# Patient Record
Sex: MALE | Race: WHITE | URBAN - METROPOLITAN AREA
[De-identification: names, ages, dates, MRNs, and addresses within clinical notes are randomized per-mention and may not be internally consistent; named-entity substitution may affect disease eponyms.]

---

## 2018-09-06 ENCOUNTER — OFFICE VISIT (OUTPATIENT)
Dept: CARDIOLOGY CLINIC | Age: 68
End: 2018-09-06

## 2018-09-06 VITALS
HEIGHT: 67 IN | WEIGHT: 276 LBS | OXYGEN SATURATION: 97 % | RESPIRATION RATE: 16 BRPM | SYSTOLIC BLOOD PRESSURE: 130 MMHG | BODY MASS INDEX: 43.32 KG/M2 | HEART RATE: 68 BPM | DIASTOLIC BLOOD PRESSURE: 80 MMHG

## 2018-09-06 DIAGNOSIS — I35.8 SYSTOLIC MURMUR OF AORTA: ICD-10-CM

## 2018-09-06 DIAGNOSIS — R94.31 ABNORMAL EKG: ICD-10-CM

## 2018-09-06 DIAGNOSIS — E66.01 MORBID OBESITY (HCC): ICD-10-CM

## 2018-09-06 DIAGNOSIS — R07.89 CHEST TIGHTNESS OR PRESSURE: Primary | ICD-10-CM

## 2018-09-06 DIAGNOSIS — R06.02 SOB (SHORTNESS OF BREATH): ICD-10-CM

## 2018-09-06 DIAGNOSIS — R94.31 T WAVE INVERSION IN EKG: ICD-10-CM

## 2018-09-06 RX ORDER — TERBINAFINE HYDROCHLORIDE 250 MG/1
TABLET ORAL
Refills: 0 | COMMUNITY
Start: 2018-08-31

## 2018-09-06 RX ORDER — HYDROCORTISONE 25 MG/G
CREAM TOPICAL
Refills: 2 | COMMUNITY
Start: 2018-08-31 | End: 2018-09-06 | Stop reason: ALTCHOICE

## 2018-09-06 NOTE — PROGRESS NOTES
Tamia Dial     1950       Corky May MD, [facility] [place]  Date of Visit-9/6/2018   PCP is Bonnie Moran MD   [facility] [place]  [facility]  HPI:  Tamia Dial is a 74 y.o. male from [facility] pt referred by and by  Bonnie Moran MD PC and  Pt [facility] [place] [facility] Sameera Fagan for shortness of breath  and chest pain . Pt states that his CP has been worsening in the past week, along with SOB, which has been going on for years. Pt complains that he also has back pain every now and then. Pt states that he had no history of prior  heart disease. Pt states. Pt is currently on hydrocortisone and terbinafine. The pain is midsternal last a few minutes, does not always happen with exertion, variable  And is dull  Denies edema, syncope  has no tachycardia, palpitations or sense of arrhythmia. EKG: sinus rhythm of 68 with T-wave inversions anterior leads. Records of lab 7-17-18 from PCP reviewed  As well as note 7-13-18  HDL 40  TSH normal Cr 0.9 hgb 14.5      Assessment/Plan:     1. Chest pressure and pain with associated SOB and aortic murmur. We will need to exclude CHF, AS or CAD. Abnormal T wave inversions non specific  2. Obesity. Advise on weight loss. 3. No evidence of anemia on recent blood work. Kidney function is normal with creatinine of 0.9.    4. Plans Echo and stress nuclear   5. Murmur likely aortic  Note abnormal EKG.    6. Follow up in 6 months if tests are WNL  If abnormal may need cath  Future Appointments  Date Time Provider Neela King   9/20/2018 8:00 AM ECHOTWO, [id number]   9/20/2018 8:30 AM HARSHAD RICCI SCHED   9/21/2018 8:30 AM HARSHAD RICCI SCHED   2/4/2019 8:20 AM Jose Duval  [address]      Patient Instructions   You will be scheduled for an echo and nuclear stress test.      You will be scheduled for nuclear stress testing after your appointment today. Wear comfortable clothing (shorts or pants with a shirt or blouse- no underwire bras) and walking or athletic shoes. Do not eat or drink anything, except water, for at least 2 hours prior to your appointment. Avoid tobacco products for at least 6 hours prior to your test.    Do not eat or drink anything containing caffeine, including but not limited to the following: chocolate, regular and decaffeinated coffee, soft drinks, or tea for at least 12-24 hours prior to your test.    Do not hold your scheduled medications prior to your test. If you are a diabetic, please ask your physician how to adjust your food and insulin prior to your test. Please bring all medications you are currently taking. You will need to inform our office if you are pregnant, nursing, or think you may be pregnant. Your test will be performed on a 2 day protocol. This is determined by your height, weight, and other risk factors. For a 2 day test, please allow for 2 hours in the office each day. For a 1 day test, please allow for 4 hours in the office that day. The radioactive isotope used for your testing is different from any of the dyes that are commonly used in x-ray procedures, and is ordered specially for your test. Please call to cancel or reschedule your appointment at least 24 hours prior to your scheduled appointment to avoid being billed for the expensive isotope. Key CAD CHF Meds     Patient is on no cardiovascular meds. Impression:   1. Chest tightness or pressure    2. SOB (shortness of breath)    3. Abnormal EKG    4. Systolic murmur of aorta    5. Morbid obesity ([address]    6.  T wave inversion in EKG         Medical Hx= colon polyps  Social Hx= no tobacco, alcohol or caffeine, works as ,  2 kids  Family Hx= father  at 76 of \"heart problems\"    REVIEW OF SYMPTOMS: A  full 12 system ROS is reviewed with aid of new patient form Review of Systems Constitutional: Negative for diaphoresis, fever and malaise/fatigue. HENT: Negative for ear pain, hearing loss, nosebleeds and tinnitus. Eyes: Negative for blurred vision, double vision and pain. Respiratory: Positive for shortness of breath. Negative for cough, hemoptysis, sputum production, wheezing and stridor. Cardiovascular: Positive for chest pain. Negative for palpitations, orthopnea, claudication and leg swelling. Gastrointestinal: Negative for abdominal pain, blood in stool, constipation, diarrhea, heartburn, melena, nausea and vomiting. Genitourinary: Negative for dysuria, frequency and urgency. Musculoskeletal: Negative for back pain, falls, joint pain, myalgias and neck pain. Skin: Negative for rash. Neurological: Negative for dizziness, sensory change, seizures, loss of consciousness, weakness and headaches. Endo/Heme/Allergies: Does not bruise/bleed easily. Psychiatric/Behavioral: Negative for depression, hallucinations and memory loss. The patient is not nervous/anxious and does not have insomnia. see scanned new patient worksheet. No Known Allergies see supplement sheet, initialed and to be scanned by staff  Past Medical History:   Diagnosis Date    Morbid obesity ([facility] [address] 0/3/9162    Systolic murmur of aorta 2/0/4189    T wave inversion in EKG 9/6/2018      Social Hx= reports that he has quit smoking. He has never used smokeless tobacco. He reports that he drinks alcohol. Exam and Labs:  /80 (BP 1 Location: Left arm, BP Patient Position: Sitting)  Pulse 68  Resp 16  Ht 5' 7\" (1.702 m)  Wt 276 lb (125.2 kg)  SpO2 97%  BMI 43.23 kg/q7Spemukqttmhswb:  NAD, comfortable  Head: NC,AT. Eyes: No scleral icterus. Neck:  Neck supple. No JVD present. Throat: moist mucous membranes. Chest: Effort normal & normal respiratory excursion . Neurological: alert, conversant and oriented . Skin: Skin is not cold. No obvious systemic rash noted. Not diaphoretic.  No erythema. Psychiatric:  Grossly normal mood and affect. Behavior appears normal. Extremities:  no clubbing or cyanosis. Abdomen: non distended    Lungs:breath sounds normal. No stridor. distress, wheezes or  Rales. Heart:1-2/6 SARKIS at RUSB and LUSB normal rate, regular rhythm, normal S1, S2, PMI non displaced. Edema: Edema is none. Wt Readings from Last 3 Encounters:   09/06/18 276 lb (125.2 kg)      BP Readings from Last 3 Encounters:   09/06/18 130/80      Current Outpatient Prescriptions   Medication Sig    terbinafine HCl (LAMISIL) 250 mg tablet TK 1 T PO QD    hydrocortisone (HYTONE) 2.5 % topical cream MESSI EXT TO FACE BID     No current facility-administered medications for this visit. Impression see above.       Written by Ankit Ward, as dictated by Barbaar Johnson MD.

## 2018-09-06 NOTE — MR AVS SNAPSHOT
[address] [facility] 
[phone] Patient: Santa Aparicio MRN: [id number] :1950 Visit Information Date & Time Provider Department Dept. Phone Encounter #  
 2018  9:00 AM Ada Fournier MD [facility] Mon[facility] B[facility]amante [phone] [id number] Upcoming Health Maintenance Date Due Hepatitis C Screening 1950 DTaP/Tdap/Td series (1 - Tdap) 1973 FOBT Q 1 YEAR AGE 50-75 2002 ZOSTER VACCINE AGE 60> 10/9/2012 GLAUCOMA SCREENING Q2Y 2017 Pneumococcal 65+ Low/Medium Risk (1 of 2 - PCV13) 2017 Influenza Age 5 to Adult 2018 Allergies as of 2018  Review Complete On: 2018 By: Ada Fournier MD  
 No Known Allergies Current Immunizations  Never Reviewed No immunizations on file. Not reviewed this visit You Were Diagnosed With   
  
 Codes Comments Chest tightness or pressure    -  Primary ICD-10-CM: R07.89 ICD-9-CM: 786.59   
 SOB (shortness of breath)     ICD-10-CM: R06.02 
ICD-9-CM: 786.05 Abnormal EKG     ICD-10-CM: R94.31 
ICD-9-CM: 794.31 Systolic murmur of aorta     ICD-10-CM: I35.8 ICD-9-CM: 785.2 Morbid obesity ([address]     ICD-10-CM: E66.01 
ICD-9-CM: 278.01 T wave inversion in EKG     ICD-10-CM: R94.31 
ICD-9-CM: 794.31 Vitals BP Pulse Resp Height(growth percentile) Weight(growth percentile) SpO2  
 130/80 (BP 1 Location: Left arm, BP Patient Position: Sitting) 68 16 5' 7\" (1.702 m) 276 lb (125.2 kg) 97% BMI Smoking Status 43.23 kg/m2 Former Smoker Vitals History BMI and BSA Data Body Mass Index Body Surface Area  
 43.23 kg/m 2 2.43 m 2 Your Updated Medication List  
  
   
This list is accurate as of 18 10:17 AM.  Always use your most recent med list.  
  
  
  
  
 terbinafine HCl 250 mg tablet Commonly known as:  LAMISIL TK 1 T PO QD  
  
  
  
  
 We Performed the Following AMB POC EKG ROUTINE W/ 12 LEADS, INTER & REP [73008 CPT(R)] ECHO LIMITED [TUY6326 Custom] To-Do List   
 09/06/2018 ECG:  STRESS TEST CARDIOLITE Patient Instructions You will be scheduled for an echo and nuclear stress test.   
 
You will be scheduled for nuclear stress testing after your appointment today. Wear comfortable clothing (shorts or pants with a shirt or blouse- no underwire bras) and walking or athletic shoes. Do not eat or drink anything, except water, for at least 2 hours prior to your appointment. Avoid tobacco products for at least 6 hours prior to your test. 
 
Do not eat or drink anything containing caffeine, including but not limited to the following: chocolate, regular and decaffeinated coffee, soft drinks, or tea for at least 12-24 hours prior to your test. 
 
Do not hold your scheduled medications prior to your test. If you are a diabetic, please ask your physician how to adjust your food and insulin prior to your test. Please bring all medications you are currently taking. You will need to inform our office if you are pregnant, nursing, or think you may be pregnant. Your test will be performed on a 2 day protocol. This is determined by your height, weight, and other risk factors. For a 2 day test, please allow for 2 hours in the office each day. For a 1 day test, please allow for 4 hours in the office that day. The radioactive isotope used for your testing is different from any of the dyes that are commonly used in x-ray procedures, and is ordered specially for your test. Please call to cancel or reschedule your appointment at least 24 hours prior to your scheduled appointment to avoid being billed for the expensive isotope. Introducing [facility]! [facility] introduces [employer] patient portal. Now you can access parts of your medical record, email your doctor's office, and request medication refills online. 1. In your internet browser, go to [email or web address] 2. Click on the First Time User? Click Here link in the Sign In box. You will see the New Member Sign Up page. 3. Enter your [employer] Access Code exactly as it appears below. You will not need to use this code after youve completed the sign-up process. If you do not sign up before the expiration date, you must request a new code. · [employer] Access Code: [email or web address] Expires: 12/5/2018  9:43 AM 
 
4. Enter the last four digits of your Social Security Number (xxxx) and Date of Birth (mm/dd/yyyy) as indicated and click Submit. You will be taken to the next sign-up page. 5. Create a [employer] ID. This will be your [employer] login ID and cannot be changed, so think of one that is secure and easy to remember. 6. Create a [employer] password. You can change your password at any time. 7. Enter your Password Reset Question and Answer. This can be used at a later time if you forget your password. 8. Enter your e-mail address. You will receive e-mail notification when new information is available in [address] 9. Click Sign Up. You can now view and download portions of your medical record. 10. Click the Download Summary menu link to download a portable copy of your medical information. If you have questions, please visit the Frequently Asked Questions section of the [employer] website. Remember, [employer] is NOT to be used for urgent needs. For medical emergencies, dial 911. Now available from your iPhone and Android! Please provide this summary of care documentation to your next provider. Your primary care clinician is listed as Billie Ho. If you have any questions after today's visit, please call [phone].

## 2018-09-06 NOTE — PATIENT INSTRUCTIONS
You will be scheduled for an echo and nuclear stress test.      You will be scheduled for nuclear stress testing after your appointment today. Wear comfortable clothing (shorts or pants with a shirt or blouse- no underwire bras) and walking or athletic shoes. Do not eat or drink anything, except water, for at least 2 hours prior to your appointment. Avoid tobacco products for at least 6 hours prior to your test.    Do not eat or drink anything containing caffeine, including but not limited to the following: chocolate, regular and decaffeinated coffee, soft drinks, or tea for at least 12-24 hours prior to your test.    Do not hold your scheduled medications prior to your test. If you are a diabetic, please ask your physician how to adjust your food and insulin prior to your test. Please bring all medications you are currently taking. You will need to inform our office if you are pregnant, nursing, or think you may be pregnant. Your test will be performed on a 2 day protocol. This is determined by your height, weight, and other risk factors. For a 2 day test, please allow for 2 hours in the office each day. For a 1 day test, please allow for 4 hours in the office that day. The radioactive isotope used for your testing is different from any of the dyes that are commonly used in x-ray procedures, and is ordered specially for your test. Please call to cancel or reschedule your appointment at least 24 hours prior to your scheduled appointment to avoid being billed for the expensive isotope.

## 2018-09-15 PROBLEM — E66.01 OBESITY, MORBID (HCC): Status: RESOLVED | Noted: 2018-09-06 | Resolved: 2018-09-15

## 2018-09-20 ENCOUNTER — CLINICAL SUPPORT (OUTPATIENT)
Dept: CARDIOLOGY CLINIC | Age: 68
End: 2018-09-20

## 2018-09-20 DIAGNOSIS — E66.01 MORBID OBESITY (HCC): ICD-10-CM

## 2018-09-20 DIAGNOSIS — R94.31 ABNORMAL EKG: ICD-10-CM

## 2018-09-20 DIAGNOSIS — R06.02 SOB (SHORTNESS OF BREATH): ICD-10-CM

## 2018-09-20 DIAGNOSIS — R94.31 T WAVE INVERSION IN EKG: ICD-10-CM

## 2018-09-20 DIAGNOSIS — I35.8 SYSTOLIC MURMUR OF AORTA: ICD-10-CM

## 2018-09-20 DIAGNOSIS — R07.89 OTHER CHEST PAIN: Primary | ICD-10-CM

## 2018-09-20 DIAGNOSIS — R07.89 CHEST TIGHTNESS OR PRESSURE: ICD-10-CM

## 2018-09-20 DIAGNOSIS — R07.9 CHEST PAIN, UNSPECIFIED TYPE: ICD-10-CM

## 2018-09-20 DIAGNOSIS — R06.02 SHORTNESS OF BREATH: ICD-10-CM

## 2018-09-20 NOTE — PROGRESS NOTES
See scanned document. Ordering Doctor:Dr. Breezy Jiménez  Reading Doctor:Dr. Breezy Jiménez    Patient tolerated procedure well.

## 2018-09-23 NOTE — PROGRESS NOTES
Let him know echo and nuclear stress test are WNL  Keep fu   2/4/2019   8:20 AM    [place] [address]

## 2018-09-23 NOTE — PROGRESS NOTES
Echo and nuclear are normal  The soft murmur that he has is just minor calcium on aortic valve not an issue  The stress test indicates unlikley to have CAD  Fu with PCP and see me back as below  2/4/2019   8:20 AM    MD Gabriela Coleman

## 2018-09-24 ENCOUNTER — TELEPHONE (OUTPATIENT)
Dept: CARDIOLOGY CLINIC | Age: 68
End: 2018-09-24

## 2018-09-24 NOTE — TELEPHONE ENCOUNTER
Spoke to patient's wife. Name noted on permission to release information. Identifiers x 2. Informed of echo and nuclear results. Verbalized understanding. Confirmed follow up date.

## 2018-09-24 NOTE — TELEPHONE ENCOUNTER
----- Message from Arturo Win MD sent at 9/23/2018 11:41 AM EDT -----  Echo and nuclear are normal  The soft murmur that he has is just minor calcium on aortic valve not an issue  The stress test indicates unlikley to have CAD  Fu with PCP and see me back as below  2/4/2019   8:20 AM    Arturo Win MD        [place] [id number]